# Patient Record
Sex: MALE | Race: WHITE | NOT HISPANIC OR LATINO | Employment: FULL TIME | RURAL
[De-identification: names, ages, dates, MRNs, and addresses within clinical notes are randomized per-mention and may not be internally consistent; named-entity substitution may affect disease eponyms.]

---

## 2018-04-30 ENCOUNTER — CONVERSION ENCOUNTER (OUTPATIENT)
Dept: FAMILY MEDICINE CLINIC | Facility: CLINIC | Age: 33
End: 2018-04-30

## 2018-04-30 LAB
ALBUMIN SERPL-MCNC: 4.7 G/DL (ref 3.6–5.1)
ALBUMIN/GLOB SERPL: ABNORMAL {RATIO} (ref 1–2.5)
ALP SERPL-CCNC: 60 UNITS/L (ref 40–115)
ALT SERPL-CCNC: 51 UNITS/L (ref 9–46)
AST SERPL-CCNC: 29 UNITS/L (ref 10–40)
BASOPHILS # BLD AUTO: ABNORMAL 10*3/MM3 (ref 0–200)
BASOPHILS NFR BLD AUTO: 0.7 %
BILIRUB SERPL-MCNC: 0.5 MG/DL (ref 0.2–1.2)
BUN SERPL-MCNC: 25 MG/DL (ref 7–25)
BUN/CREAT SERPL: ABNORMAL (ref 6–22)
CALCIUM SERPL-MCNC: 9.6 MG/DL (ref 8.6–10.3)
CHLORIDE SERPL-SCNC: 102 MMOL/L (ref 98–110)
CHOLEST SERPL-MCNC: 178 MG/DL
CHOLEST/HDLC SERPL: ABNORMAL {RATIO}
CO2 CONTENT VENOUS: 27 MMOL/L (ref 20–31)
CONV NEUTROPHILS/100 LEUKOCYTES IN BODY FLUID BY MANUAL COUNT: 49.5 %
CONV TOTAL PROTEIN: 7.3 G/DL (ref 6.1–8.1)
CREAT UR-MCNC: 0.99 MG/DL (ref 0.6–1.35)
EOSINOPHIL # BLD AUTO: 1.9 %
EOSINOPHIL # BLD AUTO: ABNORMAL 10*3/MM3 (ref 15–500)
ERYTHROCYTE [DISTWIDTH] IN BLOOD BY AUTOMATED COUNT: 12.8 % (ref 11–15)
GLOBULIN UR ELPH-MCNC: ABNORMAL G/DL (ref 1.9–3.7)
GLUCOSE SERPL-MCNC: 91 MG/DL (ref 65–99)
HCT VFR BLD AUTO: 47.1 % (ref 38.5–50)
HDLC SERPL-MCNC: 44 MG/DL
HGB BLD-MCNC: 16.1 G/DL (ref 13.2–17.1)
LDLC SERPL CALC-MCNC: ABNORMAL MG/DL
LYMPHOCYTES # BLD AUTO: ABNORMAL 10*3/MM3 (ref 850–3900)
LYMPHOCYTES NFR BLD AUTO: 35 %
MCH RBC QN AUTO: 30.2 PG (ref 27–33)
MCHC RBC AUTO-ENTMCNC: ABNORMAL % (ref 32–36)
MCV RBC AUTO: 88.4 FL (ref 80–100)
MONOCYTES # BLD AUTO: ABNORMAL 10*3/MICROLITER (ref 200–950)
MONOCYTES NFR BLD AUTO: 12.9 %
NEUTROPHILS # BLD AUTO: ABNORMAL 10*3/MM3 (ref 1500–7800)
PLATELET # BLD AUTO: ABNORMAL 10*3/MM3 (ref 140–400)
PMV BLD AUTO: 10.1 FL (ref 7.5–12.5)
POTASSIUM SERPL-SCNC: 4.4 MMOL/L (ref 3.5–5.3)
RBC # BLD AUTO: ABNORMAL 10*6/MM3 (ref 4.2–5.8)
SODIUM SERPL-SCNC: 138 MMOL/L (ref 135–146)
TRIGL SERPL-MCNC: 167 MG/DL
TSH SERPL-ACNC: 2.17 MICROINTL UNITS/ML (ref 0.4–4.5)
WBC # BLD AUTO: ABNORMAL K/UL (ref 3.8–10.8)

## 2019-06-04 VITALS
HEART RATE: 99 BPM | OXYGEN SATURATION: 97 % | DIASTOLIC BLOOD PRESSURE: 89 MMHG | BODY MASS INDEX: 37.96 KG/M2 | HEIGHT: 66 IN | WEIGHT: 236.19 LBS | RESPIRATION RATE: 18 BRPM | SYSTOLIC BLOOD PRESSURE: 130 MMHG

## 2021-01-29 ENCOUNTER — OFFICE VISIT (OUTPATIENT)
Dept: FAMILY MEDICINE CLINIC | Facility: CLINIC | Age: 36
End: 2021-01-29

## 2021-01-29 VITALS
DIASTOLIC BLOOD PRESSURE: 102 MMHG | BODY MASS INDEX: 40.53 KG/M2 | TEMPERATURE: 97.5 F | OXYGEN SATURATION: 97 % | SYSTOLIC BLOOD PRESSURE: 155 MMHG | RESPIRATION RATE: 18 BRPM | WEIGHT: 252.2 LBS | HEART RATE: 89 BPM | HEIGHT: 66 IN

## 2021-01-29 DIAGNOSIS — E66.01 MORBIDLY OBESE (HCC): ICD-10-CM

## 2021-01-29 DIAGNOSIS — R03.0 ELEVATED BLOOD PRESSURE READING: ICD-10-CM

## 2021-01-29 DIAGNOSIS — F41.9 ANXIETY: Primary | ICD-10-CM

## 2021-01-29 PROBLEM — N52.9 MALE ERECTILE DISORDER: Status: ACTIVE | Noted: 2018-04-30

## 2021-01-29 PROBLEM — J30.9 ALLERGIC RHINITIS: Status: ACTIVE | Noted: 2021-01-29

## 2021-01-29 PROBLEM — R53.83 FATIGUE: Status: ACTIVE | Noted: 2018-04-30

## 2021-01-29 PROBLEM — R68.82 REDUCED LIBIDO: Status: ACTIVE | Noted: 2018-04-30

## 2021-01-29 PROCEDURE — 99203 OFFICE O/P NEW LOW 30 MIN: CPT | Performed by: FAMILY MEDICINE

## 2021-01-29 RX ORDER — CITALOPRAM 20 MG/1
20 TABLET ORAL DAILY
Qty: 30 TABLET | Refills: 2 | Status: SHIPPED | OUTPATIENT
Start: 2021-01-29 | End: 2021-03-08 | Stop reason: SDUPTHER

## 2021-01-29 RX ORDER — MAGNESIUM 200 MG
1 TABLET ORAL
COMMUNITY
End: 2022-01-28

## 2021-01-29 NOTE — PROGRESS NOTES
"Chief Complaint  Anxiety (generalized)    Subjective    History of Present Illness        Paulino Miranda presents to [facility] for   History of Present Illness   Anxiety:   Patient is here today as a new patient to establish care and to discuss his anxiety. He reports he has had generalized anxiety since he was a teen. He was previously in counseling weekly for 5-6 months but had stopped and will soon start back. He reports his counselor recommended that he come to a doctor to discuss some type of medication in order to help with his anxiety.   Patient reports that specific things that make him anxious are his work schedule and work in general. He works for the [employer] and he has difficulty settling down to sleep because he is always wondering if they are going to call him in.   He reports hot flashes, heart racing, pacing, irritable, nausea, sometimes vomiting, insomnia, minds racing when he feels anxious. He wants to feel more at ease. He does feel therapy is helping but would like to discuss low dose medication options.     Objective   Vital Signs:   Visit Vitals  BP (!) 155/102   Pulse 89   Temp 97.5 °F (36.4 °C)   Resp 18   Ht 167.6 cm (66\")   Wt 114 kg (252 lb 3.2 oz)   SpO2 97%   BMI 40.71 kg/m²       Physical Exam  Vitals signs reviewed.   Constitutional:       Appearance: He is well-developed.   HENT:      Head: Normocephalic.      Right Ear: External ear normal.      Left Ear: External ear normal.      Nose: Nose normal.   Eyes:      Conjunctiva/sclera: Conjunctivae normal.   Neck:      Musculoskeletal: Normal range of motion and neck supple.   Cardiovascular:      Rate and Rhythm: Normal rate and regular rhythm.   Pulmonary:      Effort: Pulmonary effort is normal.      Breath sounds: Normal breath sounds.   Musculoskeletal: Normal range of motion.   Skin:     General: Skin is warm and dry.      Capillary Refill: Capillary refill takes less than 2 seconds. "   Neurological:      Mental Status: He is alert and oriented to person, place, and time.   Psychiatric:         Mood and Affect: Mood is anxious (This causes him to be irritable).            Result Review :                    Assessment and Plan      Diagnoses and all orders for this visit:    1. Anxiety (Primary)  Assessment & Plan:  Worsening his anxiety.  Patient was started on Celexa to help treat his symptoms.  Patient was encouraged to return the clinic in 1 month for follow-up.    Orders:  -     citalopram (CeleXA) 20 MG tablet; Take 1 tablet by mouth Daily.  Dispense: 30 tablet; Refill: 2    2. Morbidly obese (CMS/HCC)    3. Elevated blood pressure reading  Assessment & Plan:  Patient was advised to monitor his blood pressure at home and return to clinic in 1 month to recheck.             Follow Up   No follow-ups on file.  Patient was given instructions and counseling regarding his condition or for health maintenance advice. Please see specific information pulled into the AVS if appropriate.

## 2021-01-30 PROBLEM — F41.9 ANXIETY: Status: ACTIVE | Noted: 2021-01-30

## 2021-01-30 PROBLEM — R03.0 ELEVATED BLOOD PRESSURE READING: Status: ACTIVE | Noted: 2021-01-30

## 2021-01-30 NOTE — ASSESSMENT & PLAN NOTE
Patient was advised to monitor his blood pressure at home and return to clinic in 1 month to recheck.

## 2021-01-30 NOTE — ASSESSMENT & PLAN NOTE
Worsening his anxiety.  Patient was started on Celexa to help treat his symptoms.  Patient was encouraged to return the clinic in 1 month for follow-up.

## 2021-03-08 DIAGNOSIS — F41.9 ANXIETY: ICD-10-CM

## 2021-03-08 RX ORDER — CITALOPRAM 20 MG/1
20 TABLET ORAL DAILY
Qty: 30 TABLET | Refills: 2 | Status: SHIPPED | OUTPATIENT
Start: 2021-03-08 | End: 2021-06-30 | Stop reason: SDUPTHER

## 2021-06-30 DIAGNOSIS — F41.9 ANXIETY: ICD-10-CM

## 2021-06-30 RX ORDER — CITALOPRAM 20 MG/1
20 TABLET ORAL DAILY
Qty: 30 TABLET | Refills: 2 | Status: SHIPPED | OUTPATIENT
Start: 2021-06-30 | End: 2021-08-04 | Stop reason: SDUPTHER

## 2021-08-04 DIAGNOSIS — F41.9 ANXIETY: ICD-10-CM

## 2021-08-04 RX ORDER — CITALOPRAM 20 MG/1
20 TABLET ORAL DAILY
Qty: 18 TABLET | Refills: 0 | Status: SHIPPED | OUTPATIENT
Start: 2021-08-04 | End: 2021-08-20 | Stop reason: SDUPTHER

## 2021-08-20 ENCOUNTER — OFFICE VISIT (OUTPATIENT)
Dept: FAMILY MEDICINE CLINIC | Facility: CLINIC | Age: 36
End: 2021-08-20

## 2021-08-20 VITALS
WEIGHT: 266.2 LBS | DIASTOLIC BLOOD PRESSURE: 80 MMHG | OXYGEN SATURATION: 95 % | HEIGHT: 66 IN | HEART RATE: 77 BPM | SYSTOLIC BLOOD PRESSURE: 150 MMHG | RESPIRATION RATE: 16 BRPM | TEMPERATURE: 97.1 F | BODY MASS INDEX: 42.78 KG/M2

## 2021-08-20 DIAGNOSIS — F41.9 ANXIETY: Primary | ICD-10-CM

## 2021-08-20 PROCEDURE — 99213 OFFICE O/P EST LOW 20 MIN: CPT | Performed by: FAMILY MEDICINE

## 2021-08-20 RX ORDER — CITALOPRAM 10 MG/1
10 TABLET ORAL DAILY
Qty: 90 TABLET | Refills: 2 | Status: SHIPPED | OUTPATIENT
Start: 2021-08-20 | End: 2022-02-18 | Stop reason: SDUPTHER

## 2021-08-20 RX ORDER — DIPHENOXYLATE HYDROCHLORIDE AND ATROPINE SULFATE 2.5; .025 MG/1; MG/1
TABLET ORAL
COMMUNITY

## 2021-08-20 RX ORDER — FLUTICASONE PROPIONATE 50 MCG
SPRAY, SUSPENSION (ML) NASAL
COMMUNITY

## 2021-08-20 RX ORDER — CETIRIZINE HYDROCHLORIDE 10 MG/1
TABLET ORAL
COMMUNITY

## 2021-09-13 NOTE — ASSESSMENT & PLAN NOTE
Patient's medication was reduced to 10 mg daily.  Patient is encouraged to return to clinic in 4 weeks for follow-up.

## 2021-11-15 ENCOUNTER — OFFICE VISIT (OUTPATIENT)
Dept: SLEEP MEDICINE | Facility: CLINIC | Age: 36
End: 2021-11-15

## 2021-11-15 VITALS
SYSTOLIC BLOOD PRESSURE: 150 MMHG | DIASTOLIC BLOOD PRESSURE: 104 MMHG | HEART RATE: 119 BPM | OXYGEN SATURATION: 95 % | BODY MASS INDEX: 42.75 KG/M2 | WEIGHT: 266 LBS | HEIGHT: 66 IN

## 2021-11-15 DIAGNOSIS — E66.01 MORBID OBESITY (HCC): ICD-10-CM

## 2021-11-15 DIAGNOSIS — G47.8 NON-RESTORATIVE SLEEP: ICD-10-CM

## 2021-11-15 DIAGNOSIS — G47.10 HYPERSOMNIA: Primary | ICD-10-CM

## 2021-11-15 PROCEDURE — 99213 OFFICE O/P EST LOW 20 MIN: CPT | Performed by: FAMILY MEDICINE

## 2021-11-15 PROCEDURE — G0463 HOSPITAL OUTPT CLINIC VISIT: HCPCS

## 2021-11-15 NOTE — PROGRESS NOTES
[facility] New Patient/Consultation       Reason for Consultation: Hypersomnia nonrestorative sleep      Patient Care Team:  Haja Massey Sr., MD as PCP - General (Family Medicine)  Michael Finch MD as Consulting Physician (Sleep Medicine)      History of present illness:  Thank you for asking me to see your patient.  The patient is a 36 y.o. male with allergic rhinitis elevated blood pressure readings anxiety presents due to concern for sleep disorder.  Patient reports snoring waking up gasping for breath hypersomnia nonrestorative sleep morning headaches waking with dry mouth nocturia 1 time a night more sleepy when he increases his sleep time sudden episodes of sleep during the day getting 30 pounds in the past 5 years.  No history of prior sleep study or tonsillectomy.  No family history of sleep apnea patient is aware of.  Patient is morbidly obese with BMI 43.    Sleep Schedule:  Bed time: Weekdays 8:30 PM to 9:30 PM weekends 10 PM to 12 AM  Sleep latency: 20 to 30 minutes  Wake time: Weekdays 4 AM to 5 AM weekends 7 AM to 9 AM  Average hours slept: 5-7  Non-restorative sleep: Yes  Number of naps per day: 1 nap for 1 to 2 hours  Rotating shifts?:  No  Nocturia: Up to 1 time a night  Electronics in bedroom: Yes    Excessive daytime sleepiness or drowsiness:Y  Any accidents at work due to sleepiness in the last 5 years:N  Any difficulty driving due to sleepiness or being drowsy: Y  Weight changed in the last 5 years:Y - GAINED 30 LBS    Snoring:Y  Witnessed apneas:N  Have you ever awakened gasping for breath, coughing, choking or respiratory discomfort: Y  Morning headaches: Y  Awaken with a sore throat or dry mouth: Y    Any reports of leg jerking at night: N  Urge sensations: N  Does pain disrupt sleep: N  Sweating during sleep: N  Teeth grinding: N    Any sudden episodes of sleep during the day: Y  Sleep paralysis/hallucinations: N  Muscle weakness with laughing/anger: N  Nightmares: N  Sleep  "walking: N    Are you sleepy when you increase your sleep time: Y  Do you sleep better away from your own bed: N    ESS: 12    Social History:  tobacco use ages [age] no alcohol use no drug use 3 cups of coffee a day    Review of Systems:    A complete review of systems was done and all were negative with the exception of postnasal drip anxiety    Allergies:  Patient has no known allergies.    Family History: JAX no       Current Outpatient Medications:   •  cetirizine (zyrTEC) 10 MG tablet, Take  by mouth., Disp: , Rfl:   •  citalopram (CeleXA) 10 MG tablet, Take 1 tablet by mouth Daily., Disp: 90 tablet, Rfl: 2  •  fluticasone (FLONASE) 50 MCG/ACT nasal spray, into the nostril(s) as directed by provider., Disp: , Rfl:   •  Magnesium 200 MG tablet, Take 1 tablet by mouth., Disp: , Rfl:   •  multivitamin (MULTI-VITAMIN DAILY PO), Take  by mouth., Disp: , Rfl:   •  vitamin D3 125 MCG (5000 UT) capsule capsule, Take 5,000 Units by mouth Daily., Disp: , Rfl:   •  Zinc 50 MG capsule, Take 1 tablet by mouth., Disp: , Rfl:     Vital Signs:    Vitals:    11/15/21 1351   BP: (!) 150/104   BP Location: Left arm   Patient Position: Sitting   Cuff Size: Adult   Pulse: 119   SpO2: 95%   Weight: 121 kg (266 lb)   Height: 167.6 cm (66\")      Body mass index is 42.93 kg/m².         Physical Exam:   General Alert and oriented. No acute distress noted   Pharynx/Throat Class II/III Mallampati airway, large tongue, no evidence of redundant lateral pharyngeal tissue. No oral lesions. No thrush. Moist mucous membranes.   Head Normocephalic. Symmetrical. Atraumatic.    Nose No septal deviation. No drainage   Chest Wall Normal shape. Symmetric expansion with respiration. No tenderness.   Neck Trachea midline, no thyromegaly or adenopathy    Lungs Clear to auscultation bilaterally. No wheezes. No rhonchi. No rales. Respirations regular, even and unlabored.   Heart Regular rhythm and normal rate. Normal S1 and S2. No " murmur   Abdomen Soft, non-tender and non-distended. Normal bowel sounds. No masses.   Extremities Moves all extremities well. No edema   Psychiatric Normal mood and affect.       Impression:  1. Hypersomnia    2. Non-restorative sleep    3. Morbid obesity (HCC)        Plan:    Good sleep hygiene measures should be maintained.  Weight loss would be beneficial in this patient who is morbidly obese with BMI 43.    I discussed the pathophysiology of obstructive sleep apnea with the patient.  We discussed the adverse outcomes associated with untreated sleep-disordered breathing.  We discussed treatment modalities of obstructive sleep apnea including CPAP device as well as oral mandibular advancement device. Sleep study will be scheduled to establish definitive diagnosis of sleep disorder breathing.  Weight loss will be strongly beneficial in order to reduce the severity of sleep-disordered breathing.  Patient has narrow oropharyngeal structure.  Caution during activities that require prolonged concentration is strongly advised.  Patient will be notified of sleep study results after sleep study is completed.  If sleep apnea is only mild,  oral mandibular advancement device may be one of the treatment options.  However if sleep apnea is moderately severe, CPAP treatment will be strongly encouraged.  The patient is not opposed to treatment with CPAP device if we confirm significant obstructive sleep apnea on polysomnography.     Thank you for allowing me to participate in your patient's care.    Michael Finch MD  Sleep Medicine  11/15/21  14:26 EST

## 2021-12-01 ENCOUNTER — DOCUMENTATION (OUTPATIENT)
Dept: FAMILY MEDICINE CLINIC | Facility: CLINIC | Age: 36
End: 2021-12-01

## 2021-12-01 ENCOUNTER — TELEMEDICINE (OUTPATIENT)
Dept: FAMILY MEDICINE CLINIC | Facility: CLINIC | Age: 36
End: 2021-12-01

## 2021-12-01 DIAGNOSIS — R55 SYNCOPE, UNSPECIFIED SYNCOPE TYPE: ICD-10-CM

## 2021-12-01 DIAGNOSIS — I10 ESSENTIAL HYPERTENSION: Primary | ICD-10-CM

## 2021-12-01 DIAGNOSIS — Z09 HOSPITAL DISCHARGE FOLLOW-UP: ICD-10-CM

## 2021-12-01 DIAGNOSIS — R42 DIZZINESS: ICD-10-CM

## 2021-12-01 PROCEDURE — 99213 OFFICE O/P EST LOW 20 MIN: CPT | Performed by: FAMILY MEDICINE

## 2021-12-01 NOTE — PROGRESS NOTES
Chief Complaint  Hospital Follow Up Visit and Dizziness    Video Visit (Failed Mychart and Doximity)    Subjective    History of Present Illness        Paulino Miranda presents to [facility] for   Dizziness  This is a recurrent problem. The current episode started more than 1 month ago (11/17/2021 ). The problem occurs daily. Associated symptoms include chest pain, fatigue and vertigo (Dizziness). Pertinent negatives include no abdominal pain, anorexia, arthralgias, change in bowel habit, chills, congestion, coughing, diaphoresis, fever, headaches, joint swelling, myalgias, nausea, neck pain, numbness, rash, sore throat, swollen glands, urinary symptoms, visual change, vomiting or weakness. Associated symptoms comments: Patient was seen at [facility] 11/17/2021 for Near syncope and chest discomfort. EKG and Ct brain was performed and CT was normal. EKG was Normal Sinus and Normal ECG. He states that he is feeling ok. He states that he has been getting lightheaded when he was sneezing. He states that he has not passed out anymore and he states  that he has not felt as if he was going to. He states that his Bp was higher in the er 155/103 and he said that they was not concerned about this. He states that he is always higher when he goes places and his pressure is checked but he states that the cuff he has at home always says he is fine but he state that he is not sure that it is accurate. He states that he has been having more headaches and he states that he thought it was due to him having sinus issues. . Nothing aggravates the symptoms. He has tried nothing for the symptoms.   Hypertension  This is a new problem. The current episode started 1 to 4 weeks ago. The problem is unchanged. The problem is uncontrolled. Associated symptoms include anxiety, chest pain and malaise/fatigue. Pertinent negatives include no blurred vision, headaches, neck pain, orthopnea, palpitations, peripheral edema,  PND, shortness of breath or sweats. (In the ER on 11/17/2021 it was 155/103 and he was told it was normal ) Risk factors for coronary artery disease include family history, male gender, obesity and stress. Past treatments include nothing. Current antihypertension treatment includes nothing. The current treatment provides no improvement. There are no compliance problems.  There is no history of angina, kidney disease, CAD/MI, CVA, heart failure, left ventricular hypertrophy, PVD or retinopathy. There is no history of chronic renal disease, coarctation of the aorta, hyperaldosteronism, hypercortisolism, hyperparathyroidism, a hypertension causing med, pheochromocytoma, renovascular disease, sleep apnea or a thyroid problem.      Hospital Follow up :   Paulino was seen at [facility] on 11/17/2021. He was seen for chest pain and near syncope. Labs that were performed during the encounter included: labs was performed AST was 48 and ALT was 89 . Diagnostic studies that were performed included: EKG , CT brain w/o contrast . Medication changes: None .      Objective   Vital Signs:   There were no vitals taken for this visit.    Physical Exam       Result Review :                    Assessment and Plan      Diagnoses and all orders for this visit:    1. Essential hypertension (Primary)  Assessment & Plan:  Hypertension is worsening.  Continue current treatment regimen.  Dietary sodium restriction.  Weight loss.  Regular aerobic exercise.  Blood pressure will be reassessed at the next regular appointment.      2. Dizziness  Assessment & Plan:  Unknown etiology to patient's symptoms.  Carotid duplex be ordered to rule out causes of patient's syncope.  Consider referral to neurology for syncope worsens      3. Hospital discharge follow-up       I spent 15 minutes caring for Paulino on this date of service. This time includes time spent by me in the following activities:preparing for the visit, ordering medications,  tests, or procedures and care coordination    The use of a video visit has been reviewed with the patient and verbal informed consent has been obtained.      Follow Up   No follow-ups on file.  Patient was given instructions and counseling regarding his condition or for health maintenance advice. Please see specific information pulled into the AVS if appropriate.

## 2021-12-08 ENCOUNTER — TELEPHONE (OUTPATIENT)
Dept: FAMILY MEDICINE CLINIC | Facility: CLINIC | Age: 36
End: 2021-12-08

## 2021-12-08 NOTE — TELEPHONE ENCOUNTER
I have called downstairs and she said that he is on the scheduling list. She said that she will call him this AM to get it scheduled

## 2021-12-08 NOTE — TELEPHONE ENCOUNTER
Caller: Paulino Miranda    Relationship: Self    Best call back number: [phone]    What orders are you requesting (i.e. lab or imaging): CAROTID ARTERY ULTRASOUND      Additional notes: PATIENT CALLED TO CHECK ON THE STATUS OF THESE ORDERS.  PLEASE CALL AND ADVISE.

## 2021-12-09 ENCOUNTER — HOSPITAL ENCOUNTER (OUTPATIENT)
Dept: ULTRASOUND IMAGING | Facility: HOSPITAL | Age: 36
Discharge: HOME | End: 2021-12-09
Admitting: FAMILY MEDICINE

## 2021-12-09 DIAGNOSIS — R55 SYNCOPE, UNSPECIFIED SYNCOPE TYPE: ICD-10-CM

## 2021-12-09 DIAGNOSIS — R42 DIZZINESS: ICD-10-CM

## 2021-12-09 PROCEDURE — 93880 EXTRACRANIAL BILAT STUDY: CPT

## 2021-12-13 ENCOUNTER — HOSPITAL ENCOUNTER (OUTPATIENT)
Dept: SLEEP MEDICINE | Facility: HOSPITAL | Age: 36
Discharge: HOME | End: 2021-12-13
Admitting: FAMILY MEDICINE

## 2021-12-13 DIAGNOSIS — G47.8 NON-RESTORATIVE SLEEP: ICD-10-CM

## 2021-12-13 DIAGNOSIS — G47.10 HYPERSOMNIA: ICD-10-CM

## 2021-12-13 DIAGNOSIS — E66.01 MORBID OBESITY: ICD-10-CM

## 2021-12-13 PROCEDURE — 95806 SLEEP STUDY UNATT&RESP EFFT: CPT

## 2021-12-13 PROCEDURE — 95806 SLEEP STUDY UNATT&RESP EFFT: CPT | Performed by: FAMILY MEDICINE

## 2022-01-03 ENCOUNTER — OFFICE VISIT (OUTPATIENT)
Dept: FAMILY MEDICINE CLINIC | Facility: CLINIC | Age: 37
End: 2022-01-03

## 2022-01-03 VITALS
SYSTOLIC BLOOD PRESSURE: 140 MMHG | OXYGEN SATURATION: 98 % | BODY MASS INDEX: 41.49 KG/M2 | WEIGHT: 280.1 LBS | TEMPERATURE: 97.9 F | HEART RATE: 115 BPM | HEIGHT: 69 IN | RESPIRATION RATE: 18 BRPM | DIASTOLIC BLOOD PRESSURE: 98 MMHG

## 2022-01-03 DIAGNOSIS — E66.01 MORBIDLY OBESE: ICD-10-CM

## 2022-01-03 DIAGNOSIS — F17.200 TOBACCO USE DISORDER: ICD-10-CM

## 2022-01-03 DIAGNOSIS — G47.33 OBSTRUCTIVE SLEEP APNEA: Primary | ICD-10-CM

## 2022-01-03 DIAGNOSIS — I10 ESSENTIAL HYPERTENSION: Primary | ICD-10-CM

## 2022-01-03 DIAGNOSIS — G47.10 HYPERSOMNIA: ICD-10-CM

## 2022-01-03 DIAGNOSIS — R55 SYNCOPE, UNSPECIFIED SYNCOPE TYPE: ICD-10-CM

## 2022-01-03 DIAGNOSIS — E66.01 MORBID OBESITY: ICD-10-CM

## 2022-01-03 DIAGNOSIS — G47.8 NON-RESTORATIVE SLEEP: ICD-10-CM

## 2022-01-03 PROCEDURE — 99214 OFFICE O/P EST MOD 30 MIN: CPT | Performed by: FAMILY MEDICINE

## 2022-01-03 RX ORDER — LISINOPRIL AND HYDROCHLOROTHIAZIDE 12.5; 1 MG/1; MG/1
1 TABLET ORAL DAILY
Qty: 30 TABLET | Refills: 1 | Status: SHIPPED | OUTPATIENT
Start: 2022-01-03 | End: 2022-02-18 | Stop reason: SDUPTHER

## 2022-01-03 NOTE — PROGRESS NOTES
Chief Complaint  Syncope and Hypertension    Subjective    History of Present Illness        Paulino Miranda presents to [facility] for   Paulino was seen at [facility] on 11.17/2021. He was seen for syncope. Labs that were performed during the encounter included: Diagnostic studies that were performed included:  Medication changes: none.    Syncope:   The syncopal episode occurred while the patient was standing, one month ago. The fainting episode followed none. Prodromal symptoms included dizziness and lightheadedness. History was given by the patient. The patient a sudden loss of consciousness without warning. There has been only one episode. Preventive factors include none. Associated symptoms included no other pertinent symptoms. Current treatment includes none.    Hypertension  This is a new problem. The current episode started more than 1 year ago. The problem has been gradually worsening since onset. The problem is uncontrolled. Associated symptoms include malaise/fatigue. Pertinent negatives include no chest pain, headaches, orthopnea, palpitations, peripheral edema, PND, shortness of breath or sweats. Risk factors for coronary artery disease include obesity and male gender. Past treatments include nothing. Current antihypertension treatment includes nothing.      Anxiety:   Patient is here today as a new patient to establish care and to discuss his anxiety. He reports he has had generalized anxiety since he was a teen. He was previously in counseling weekly for 5-6 months but had stopped and will soon start back. He reports his counselor recommended that he come to a doctor to discuss some type of medication in order to help with his anxiety.   Patient reports that specific things that make him anxious are his work schedule and work in general. He works for the [employer] and he has difficulty settling down to sleep because he is always wondering if they are  "going to call him in.   He reports hot flashes, heart racing, pacing, irritable, nausea, sometimes vomiting, insomnia, minds racing when he feels anxious. He wants to feel more at ease. He does feel therapy is helping but would like to discuss low dose medication options.     Objective   Vital Signs:   Visit Vitals  /98 (BP Location: Right arm, Patient Position: Sitting, Cuff Size: Adult)   Pulse 115   Temp 97.9 °F (36.6 °C) (Temporal)   Resp 18   Ht 175.3 cm (69\")   Wt 127 kg (280 lb 1.6 oz)   SpO2 98% Comment: room air   BMI 41.36 kg/m²       Physical Exam  Vitals reviewed.   Constitutional:       Appearance: He is well-developed.   HENT:      Head: Normocephalic.      Right Ear: External ear normal.      Left Ear: External ear normal.      Nose: Nose normal.   Eyes:      Conjunctiva/sclera: Conjunctivae normal.   Cardiovascular:      Rate and Rhythm: Normal rate and regular rhythm.   Pulmonary:      Effort: Pulmonary effort is normal.      Breath sounds: Normal breath sounds.   Musculoskeletal:         General: Normal range of motion.      Cervical back: Normal range of motion and neck supple.   Skin:     General: Skin is warm and dry.      Capillary Refill: Capillary refill takes less than 2 seconds.   Neurological:      Mental Status: He is alert and oriented to person, place, and time.   Psychiatric:         Mood and Affect: Mood is anxious (This causes him to be irritable).            Result Review :                    Assessment and Plan      Diagnoses and all orders for this visit:    1. Essential hypertension (Primary)  Assessment & Plan:  Hypertension is worsening.  Continue current treatment regimen.  Dietary sodium restriction.  Weight loss.  Regular aerobic exercise.  Blood pressure will be reassessed in 4 weeks.    Orders:  -     lisinopril-hydrochlorothiazide (PRINZIDE,ZESTORETIC) 10-12.5 MG per tablet; Take 1 tablet by mouth Daily.  Dispense: 30 tablet; Refill: 1    2. Syncope, unspecified " syncope type  Assessment & Plan:  Processes a syncopal episode occurred during laughter or sneezing.  Unknown etiology of symptoms.  Refer patient to cardiology for further evaluation and treatment.        3. Morbidly obese (HCC)    4. Tobacco use disorder           Follow Up   No follow-ups on file.  Patient was given instructions and counseling regarding his condition or for health maintenance advice. Please see specific information pulled into the AVS if appropriate.       Answers for HPI/ROS submitted by the patient on 8/13/2021  What is the primary reason for your visit?: Physical

## 2022-01-06 DIAGNOSIS — R55 SYNCOPE, UNSPECIFIED SYNCOPE TYPE: ICD-10-CM

## 2022-01-06 DIAGNOSIS — I10 ESSENTIAL HYPERTENSION: Primary | ICD-10-CM

## 2022-01-06 NOTE — ASSESSMENT & PLAN NOTE
Processes a syncopal episode occurred during laughter or sneezing.  Unknown etiology of symptoms.  Refer patient to cardiology for further evaluation and treatment.

## 2022-01-27 NOTE — PROGRESS NOTES
Date of Office Visit: 2022  Encounter Provider: Dr. Richmond Martines  Place of Service: [facility] [place]  Patient Name: Paulino Miranda  :1985  Haja Massey Sr., MD    Chief Complaint   Patient presents with   • Hypertension     consult   • Syncope     History of Present Illness:    I am pleased to see Mr. Miranda in my office today as a new consultation.    As you know, patient is 36-year-old white gentleman whose past medical history significant for hypertension, who is referred to me for symptom of syncope.    In 2021, patient was at his work when he was sitting and he started laughing.  And immediately after the laughing spell, patient passed out.  The spell was short-lived.  Patient had few episodes of dizziness and near syncope since then.  Patient reports that mostly is triggered with laughing but it has also been without that.  Patient complain of palpitation and chest discomfort.  Patient denies any orthopnea PND.  No leg edema noted.    Patient does not have previous history of CAD, PCI or MI.  No previous history of tobacco abuse, or alcohol abuse.    EKG done in PCP office shows normal sinus rhythm.  Short CO interval.    At this stage, I would recommend that patient should proceed with stress echocardiography.  I would proceed with event monitor.  Patient would need tilt table test in future if initial testings are unremarkable        Past Medical History:   Diagnosis Date   • Anxiety    • Hypertension    • Sleep apnea    • Syncope          History reviewed. No pertinent surgical history.        Current Outpatient Medications:   •  cetirizine (zyrTEC) 10 MG tablet, Take  by mouth., Disp: , Rfl:   •  citalopram (CeleXA) 10 MG tablet, Take 1 tablet by mouth Daily., Disp: 90 tablet, Rfl: 2  •  fluticasone (FLONASE) 50 MCG/ACT nasal spray, into the nostril(s) as directed by provider., Disp: , Rfl:   •  lisinopril-hydrochlorothiazide (PRINZIDE,ZESTORETIC) 10-12.5 MG per  "tablet, Take 1 tablet by mouth Daily., Disp: 30 tablet, Rfl: 1  •  multivitamin (MULTI-VITAMIN DAILY PO), Take  by mouth., Disp: , Rfl:   •  vitamin D3 125 MCG (5000 UT) capsule capsule, Take 5,000 Units by mouth Daily., Disp: , Rfl:       Social History     Socioeconomic History   • Marital status:    Tobacco Use   • Smoking status: Never Smoker   • Smokeless tobacco: Current User     Types: Snuff   Vaping Use   • Vaping Use: Never used   Substance and Sexual Activity   • Alcohol use: Not Currently   • Drug use: Never   • Sexual activity: Defer         Review of Systems   Constitutional: Negative for chills and fever.   HENT: Negative for ear discharge and nosebleeds.    Eyes: Negative for discharge and redness.   Cardiovascular: Positive for chest pain, palpitations and syncope. Negative for orthopnea and paroxysmal nocturnal dyspnea.   Respiratory: Negative for cough, shortness of breath and wheezing.    Endocrine: Negative for heat intolerance.   Skin: Negative for rash.   Musculoskeletal: Negative for arthritis and myalgias.   Gastrointestinal: Negative for abdominal pain, melena, nausea and vomiting.   Genitourinary: Negative for dysuria and hematuria.   Neurological: Negative for dizziness, light-headedness, numbness and tremors.   Psychiatric/Behavioral: Negative for depression. The patient is not nervous/anxious.        Procedures    Procedures    No orders to display           Objective:    /87   Pulse 89   Ht 175.3 cm (69.02\")   Wt 127 kg (280 lb)   BMI 41.33 kg/m²         Constitutional:       Appearance: Well-developed.   Eyes:      General: No scleral icterus.        Right eye: No discharge.   HENT:      Head: Normocephalic and atraumatic.   Neck:      Thyroid: No thyromegaly.      Lymphadenopathy: No cervical adenopathy.   Pulmonary:      Effort: Pulmonary effort is normal. No respiratory distress.      Breath sounds: Normal breath sounds. No wheezing. No rales.   Cardiovascular:      " Normal rate. Regular rhythm.      No gallop.   Abdominal:      Tenderness: There is no abdominal tenderness.   Skin:     Findings: No erythema or rash.   Neurological:      Mental Status: Alert and oriented to person, place, and time.             Assessment:       Diagnosis Plan   1. Essential hypertension  Adult Stress Echo W/ Cont or Stress Agent if Necessary Per Protocol    Cardiac Event Monitor   2. Syncope, unspecified syncope type  Adult Stress Echo W/ Cont or Stress Agent if Necessary Per Protocol    Cardiac Event Monitor   3. Palpitations  Adult Stress Echo W/ Cont or Stress Agent if Necessary Per Protocol    Cardiac Event Monitor            Plan:       MDM:    1.  Palpitation:    I would recommend to proceed with event monitor    2.  Syncope:    I would recommend to proceed with stress test with echocardiography and event monitor.  Patient may need tilt table test    3.  Hypertension:    Continue lisinopril.  Monitor the blood pressure at home and bring the logbook.  Consider Bystolic or Toprol-XL in future if blood pressure control required

## 2022-01-28 ENCOUNTER — OFFICE VISIT (OUTPATIENT)
Dept: CARDIOLOGY | Facility: CLINIC | Age: 37
End: 2022-01-28

## 2022-01-28 VITALS
SYSTOLIC BLOOD PRESSURE: 137 MMHG | BODY MASS INDEX: 41.47 KG/M2 | HEART RATE: 89 BPM | DIASTOLIC BLOOD PRESSURE: 87 MMHG | HEIGHT: 69 IN | WEIGHT: 280 LBS

## 2022-01-28 DIAGNOSIS — I10 ESSENTIAL HYPERTENSION: Primary | ICD-10-CM

## 2022-01-28 DIAGNOSIS — R55 SYNCOPE, UNSPECIFIED SYNCOPE TYPE: ICD-10-CM

## 2022-01-28 DIAGNOSIS — R00.2 PALPITATIONS: ICD-10-CM

## 2022-01-28 PROCEDURE — 99204 OFFICE O/P NEW MOD 45 MIN: CPT | Performed by: INTERNAL MEDICINE

## 2022-02-18 ENCOUNTER — OFFICE VISIT (OUTPATIENT)
Dept: FAMILY MEDICINE CLINIC | Facility: CLINIC | Age: 37
End: 2022-02-18

## 2022-02-18 VITALS
RESPIRATION RATE: 18 BRPM | HEIGHT: 70 IN | TEMPERATURE: 98.1 F | BODY MASS INDEX: 39.22 KG/M2 | SYSTOLIC BLOOD PRESSURE: 135 MMHG | OXYGEN SATURATION: 98 % | DIASTOLIC BLOOD PRESSURE: 70 MMHG | WEIGHT: 274 LBS | HEART RATE: 86 BPM

## 2022-02-18 DIAGNOSIS — F41.9 ANXIETY: ICD-10-CM

## 2022-02-18 DIAGNOSIS — I10 ESSENTIAL HYPERTENSION: Primary | ICD-10-CM

## 2022-02-18 PROCEDURE — 99214 OFFICE O/P EST MOD 30 MIN: CPT | Performed by: FAMILY MEDICINE

## 2022-02-18 RX ORDER — CITALOPRAM 10 MG/1
10 TABLET ORAL DAILY
Qty: 90 TABLET | Refills: 3 | Status: SHIPPED | OUTPATIENT
Start: 2022-02-18 | End: 2022-09-12 | Stop reason: SDUPTHER

## 2022-02-18 RX ORDER — LISINOPRIL AND HYDROCHLOROTHIAZIDE 12.5; 1 MG/1; MG/1
1 TABLET ORAL DAILY
Qty: 90 TABLET | Refills: 2 | Status: SHIPPED | OUTPATIENT
Start: 2022-02-18 | End: 2022-07-20

## 2022-02-21 ENCOUNTER — APPOINTMENT (OUTPATIENT)
Dept: RESPIRATORY THERAPY | Facility: HOSPITAL | Age: 37
End: 2022-02-21

## 2022-02-21 ENCOUNTER — APPOINTMENT (OUTPATIENT)
Dept: CARDIOLOGY | Facility: HOSPITAL | Age: 37
End: 2022-02-21

## 2022-03-12 NOTE — ASSESSMENT & PLAN NOTE
Patient is doing well on Celexa.  Patient was given a refill of his medication.  Patient is encouraged return to clinic in 6 months.

## 2022-07-20 ENCOUNTER — OFFICE VISIT (OUTPATIENT)
Dept: FAMILY MEDICINE CLINIC | Facility: CLINIC | Age: 37
End: 2022-07-20

## 2022-07-20 VITALS
DIASTOLIC BLOOD PRESSURE: 82 MMHG | WEIGHT: 284 LBS | BODY MASS INDEX: 40.66 KG/M2 | HEART RATE: 104 BPM | SYSTOLIC BLOOD PRESSURE: 122 MMHG | HEIGHT: 70 IN | OXYGEN SATURATION: 96 % | TEMPERATURE: 97.7 F

## 2022-07-20 DIAGNOSIS — Z11.59 ENCOUNTER FOR HEPATITIS C SCREENING TEST FOR LOW RISK PATIENT: ICD-10-CM

## 2022-07-20 DIAGNOSIS — I10 ESSENTIAL HYPERTENSION: ICD-10-CM

## 2022-07-20 DIAGNOSIS — N39.43 DRIBBLING URINE: ICD-10-CM

## 2022-07-20 DIAGNOSIS — Z00.01 ENCOUNTER FOR GENERAL ADULT MEDICAL EXAMINATION WITH ABNORMAL FINDINGS: Primary | ICD-10-CM

## 2022-07-20 PROCEDURE — 99213 OFFICE O/P EST LOW 20 MIN: CPT | Performed by: NURSE PRACTITIONER

## 2022-07-20 PROCEDURE — 99395 PREV VISIT EST AGE 18-39: CPT | Performed by: NURSE PRACTITIONER

## 2022-07-20 RX ORDER — ALBUTEROL SULFATE 90 UG/1
AEROSOL, METERED RESPIRATORY (INHALATION)
COMMUNITY
Start: 2022-05-18 | End: 2022-07-20

## 2022-07-20 RX ORDER — LISINOPRIL 20 MG/1
20 TABLET ORAL DAILY
Qty: 90 TABLET | Refills: 1 | Status: SHIPPED | OUTPATIENT
Start: 2022-07-20 | End: 2023-01-11

## 2022-07-20 NOTE — PROGRESS NOTES
"Subjective   Paulino Miranda is a 37 y.o. male presents for   Chief Complaint   Patient presents with   • Establish Care     Annual    • Medication Reaction     Blood pressure medication    • Hypertension     Annual        Health Maintenance Due   Topic Date Due   • HEPATITIS C SCREENING  Never done       History of Present Illness   Pt present to establish care, annual physical, and to follow up htn.  He states he has been experiencing dribbling of urine 2-3 time a week and feels it is related to diuretic.  He denies pain or burning with urination.  He denies other problems or side effects at this time.    Discussed benefits of balanced diet and routine exercise.  Also reviewed vaccines and pt is utd on all recommended vaccines.  Discussed hep c screening and pt risk level in his job.  He is agreeable to testing.     Vitals:    07/20/22 1443   BP: 122/82   BP Location: Right arm   Patient Position: Sitting   Cuff Size: Adult   Pulse: 104   Temp: 97.7 °F (36.5 °C)   TempSrc: Temporal   SpO2: 96%   Weight: 129 kg (284 lb)   Height: 177.8 cm (70\")     Body mass index is 40.75 kg/m².    Current Outpatient Medications on File Prior to Visit   Medication Sig Dispense Refill   • cetirizine (zyrTEC) 10 MG tablet Take  by mouth.     • citalopram (CeleXA) 10 MG tablet Take 1 tablet by mouth Daily. 90 tablet 3   • fluticasone (FLONASE) 50 MCG/ACT nasal spray into the nostril(s) as directed by provider.     • multivitamin (THERAGRAN) tablet tablet Take  by mouth.     • vitamin D3 125 MCG (5000 UT) capsule capsule Take 5,000 Units by mouth Daily.     • [DISCONTINUED] lisinopril-hydrochlorothiazide (PRINZIDE,ZESTORETIC) 10-12.5 MG per tablet Take 1 tablet by mouth Daily. 90 tablet 2   • [DISCONTINUED] albuterol sulfate  (90 Base) MCG/ACT inhaler INHALE 2 PUFFS INTO THE LUNGS EVERY 4 HOURS AS NEEDED WHEEZING       No current facility-administered medications on file prior to visit.       The following portions of the " patient's history were reviewed and updated as appropriate: allergies, current medications, past family history, past medical history, past social history, past surgical history, and problem list.    Review of Systems   Constitutional: Negative for chills and fever.   HENT: Negative for sinus pressure and sore throat.    Eyes: Negative for blurred vision.   Respiratory: Negative for cough and shortness of breath.    Cardiovascular: Negative for chest pain.   Gastrointestinal: Negative for abdominal pain.   Endocrine: Negative.    Genitourinary:        Dribbling urine 2-3 times a week   Musculoskeletal: Negative for arthralgias and joint swelling.   Skin: Negative for color change.   Allergic/Immunologic: Negative.    Neurological: Negative for dizziness.   Hematological: Negative.    Psychiatric/Behavioral: Negative for behavioral problems.       Objective   Physical Exam  Vitals and nursing note reviewed.   Constitutional:       Appearance: Normal appearance. He is well-developed. He is obese.   HENT:      Head: Normocephalic and atraumatic.      Right Ear: External ear normal.      Left Ear: External ear normal.      Nose: Nose normal.   Eyes:      Extraocular Movements: Extraocular movements intact.      Pupils: Pupils are equal, round, and reactive to light.   Cardiovascular:      Rate and Rhythm: Normal rate and regular rhythm.      Heart sounds: Normal heart sounds.   Pulmonary:      Effort: Pulmonary effort is normal.      Breath sounds: Normal breath sounds.   Abdominal:      General: Bowel sounds are normal.      Palpations: Abdomen is soft.   Musculoskeletal:         General: Normal range of motion.      Cervical back: Normal range of motion and neck supple.   Skin:     General: Skin is warm and dry.   Neurological:      General: No focal deficit present.      Mental Status: He is alert and oriented to person, place, and time.   Psychiatric:         Mood and Affect: Mood normal.         Behavior: Behavior  normal.       PHQ-9 Total Score:      Assessment & Plan   Diagnoses and all orders for this visit:    1. Encounter for general adult medical examination with abnormal findings (Primary)  -     Comprehensive Metabolic Panel; Future  -     Lipid Panel; Future  -     TSH; Future    2. Essential hypertension  Comments:  monitor BP and call if it gets too low.  lisinopril increased to 20 mg and hctz stopped.   Orders:  -     CBC Auto Differential; Future    3. Encounter for hepatitis C screening test for low risk patient  -     Hepatitis C Antibody; Future    4. Dribbling urine  Comments:  hctz stopped.  pt instructed to call if symptoms persist and will do additional labs to determine cause.     Other orders  -     lisinopril (PRINIVIL,ZESTRIL) 20 MG tablet; Take 1 tablet by mouth Daily.  Dispense: 90 tablet; Refill: 1        There are no Patient Instructions on file for this visit.

## 2022-08-13 ENCOUNTER — PATIENT ROUNDING (BHMG ONLY) (OUTPATIENT)
Dept: FAMILY MEDICINE CLINIC | Facility: CLINIC | Age: 37
End: 2022-08-13

## 2022-08-13 NOTE — PROGRESS NOTES
A [employer] message has been sent to the patient for patient rounding with [facility]

## 2022-09-12 ENCOUNTER — OFFICE VISIT (OUTPATIENT)
Dept: FAMILY MEDICINE CLINIC | Facility: CLINIC | Age: 37
End: 2022-09-12

## 2022-09-12 VITALS
HEART RATE: 111 BPM | WEIGHT: 280 LBS | OXYGEN SATURATION: 96 % | SYSTOLIC BLOOD PRESSURE: 128 MMHG | TEMPERATURE: 97.1 F | BODY MASS INDEX: 40.09 KG/M2 | HEIGHT: 70 IN | DIASTOLIC BLOOD PRESSURE: 98 MMHG | RESPIRATION RATE: 18 BRPM

## 2022-09-12 DIAGNOSIS — J01.00 ACUTE NON-RECURRENT MAXILLARY SINUSITIS: ICD-10-CM

## 2022-09-12 DIAGNOSIS — N52.2 DRUG-INDUCED ERECTILE DYSFUNCTION: ICD-10-CM

## 2022-09-12 DIAGNOSIS — I10 ESSENTIAL HYPERTENSION: ICD-10-CM

## 2022-09-12 DIAGNOSIS — F41.9 ANXIETY: Primary | ICD-10-CM

## 2022-09-12 PROCEDURE — 99214 OFFICE O/P EST MOD 30 MIN: CPT | Performed by: FAMILY MEDICINE

## 2022-09-12 RX ORDER — GUAIFENESIN AND CODEINE PHOSPHATE 100; 10 MG/5ML; MG/5ML
5 SOLUTION ORAL 3 TIMES DAILY PRN
Qty: 180 ML | Refills: 0 | Status: SHIPPED | OUTPATIENT
Start: 2022-09-12

## 2022-09-12 RX ORDER — PREDNISONE 20 MG/1
40 TABLET ORAL DAILY
Qty: 10 TABLET | Refills: 0 | Status: SHIPPED | OUTPATIENT
Start: 2022-09-12 | End: 2022-09-17

## 2022-09-12 RX ORDER — CIPROFLOXACIN 500 MG/1
500 TABLET, FILM COATED ORAL 2 TIMES DAILY
Qty: 20 TABLET | Refills: 0 | Status: SHIPPED | OUTPATIENT
Start: 2022-09-12

## 2022-09-12 RX ORDER — CITALOPRAM 20 MG/1
20 TABLET ORAL DAILY
Qty: 90 TABLET | Refills: 3 | Status: SHIPPED | OUTPATIENT
Start: 2022-09-12

## 2022-09-12 RX ORDER — TADALAFIL 5 MG/1
5 TABLET ORAL DAILY PRN
Qty: 90 TABLET | Refills: 3 | Status: SHIPPED | OUTPATIENT
Start: 2022-09-12

## 2022-09-12 NOTE — ASSESSMENT & PLAN NOTE
Worsening.  Patient Celexa was increased.  Patient was encouraged return to clinic in 3 months for follow-up.

## 2022-09-12 NOTE — PROGRESS NOTES
"Chief Complaint  Chief Complaint   Patient presents with   • mucus drainage     Pt c/o mucus drainage and ear ache x 4 days   • Hypertension       Subjective    History of Present Illness        Paulino Miranda presents to [facility] for   Hypertension  This is a chronic problem. The current episode started more than 1 year ago. The problem has been rapidly improving since onset. The problem is controlled. Associated symptoms include anxiety. Pertinent negatives include no blurred vision, chest pain, headaches, malaise/fatigue, neck pain, orthopnea, palpitations, peripheral edema, PND, shortness of breath or sweats. Risk factors for coronary artery disease include male gender. Past treatments include ACE inhibitors. Current antihypertension treatment includes ACE inhibitors. There are no compliance problems.    Anxiety  Presents for follow-up visit. Symptoms include decreased concentration (sometimes), irritability and nervous/anxious behavior. Patient reports no chest pain, compulsions, insomnia, muscle tension, palpitations or shortness of breath. Symptoms occur rarely (with Celexa 10mg). The severity of symptoms is mild. The patient sleeps 6 hours (to 8) per night. The quality of sleep is good. Nighttime awakenings: occasional.     Compliance with medications is %.   Sinusitis  This is a new problem. The current episode started in the past 7 days. The problem has been gradually worsening since onset. There has been no fever. Associated symptoms include congestion, coughing, ear pain and sinus pressure. Pertinent negatives include no headaches, neck pain or shortness of breath. The treatment provided no relief.        Objective   Vital Signs:   Visit Vitals  /98   Pulse 111   Temp 97.1 °F (36.2 °C)   Resp 18   Ht 177.8 cm (70\")   Wt 127 kg (280 lb)   SpO2 96%   BMI 40.18 kg/m²       Class 3 Severe Obesity (BMI >=40). Obesity-related health conditions include the " following: hypertension. Obesity is unchanged. BMI is is above average; BMI management plan is completed. We discussed low calorie, low carb based diet program, portion control and increasing exercise.     Physical Exam  Vitals reviewed.   Constitutional:       Appearance: He is well-developed. He is ill-appearing.   HENT:      Head: Normocephalic.      Right Ear: Tympanic membrane, ear canal and external ear normal.      Left Ear: Tympanic membrane, ear canal and external ear normal.      Nose: Rhinorrhea present.      Mouth/Throat:      Pharynx: Posterior oropharyngeal erythema present.   Eyes:      Conjunctiva/sclera: Conjunctivae normal.   Cardiovascular:      Rate and Rhythm: Normal rate and regular rhythm.      Heart sounds: Normal heart sounds.   Pulmonary:      Effort: Pulmonary effort is normal.      Breath sounds: Normal breath sounds.   Musculoskeletal:         General: Normal range of motion.      Cervical back: Normal range of motion.   Skin:     General: Skin is warm and dry.      Capillary Refill: Capillary refill takes less than 2 seconds.   Neurological:      Mental Status: He is alert and oriented to person, place, and time.            Result Review :                    Assessment and Plan      Diagnoses and all orders for this visit:    1. Anxiety (Primary)  Assessment & Plan:  Worsening.  Patient Celexa was increased.  Patient was encouraged return to clinic in 3 months for follow-up.    Orders:  -     citalopram (CeleXA) 20 MG tablet; Take 1 tablet by mouth Daily.  Dispense: 90 tablet; Refill: 3    2. Drug-induced erectile dysfunction  Assessment & Plan:  Patient was started on Cialis 5 mg daily.  Patient is encouraged return to clinic if her symptoms do not improve.    Orders:  -     tadalafil (CIALIS) 5 MG tablet; Take 1 tablet by mouth Daily As Needed for Erectile Dysfunction.  Dispense: 90 tablet; Refill: 3    3. Acute non-recurrent maxillary sinusitis  Assessment & Plan:  he was prescribed  Cipro, prednisone and Cheratussin to treat his symptoms.    Increase fluids. Tylenol/motrin for pain or fever.   Medication and medication adverse effects discussed.    Follow-up 5-7 days for reevaluation if not improved or sooner if needed.    Orders:  -     ciprofloxacin (CIPRO) 500 MG tablet; Take 1 tablet by mouth 2 (Two) Times a Day.  Dispense: 20 tablet; Refill: 0  -     predniSONE (DELTASONE) 20 MG tablet; Take 2 tablets by mouth Daily for 5 days.  Dispense: 10 tablet; Refill: 0  -     guaiFENesin-codeine (GUAIFENESIN AC) 100-10 MG/5ML liquid; Take 5 mL by mouth 3 (Three) Times a Day As Needed for Cough.  Dispense: 180 mL; Refill: 0    4. Essential hypertension  Assessment & Plan:  Hypertension is worsening.  Continue current treatment regimen.  Dietary sodium restriction.  Weight loss.  Regular aerobic exercise.  Continue current medications.  Blood pressure will be reassessed in 3 months.             Follow Up   No follow-ups on file.  Patient was given instructions and counseling regarding his condition or for health maintenance advice. Please see specific information pulled into the AVS if appropriate.       Answers for HPI/ROS submitted by the patient on 9/6/2022  What is the primary reason for your visit?: High Blood Pressure

## 2022-09-12 NOTE — ASSESSMENT & PLAN NOTE
Patient was started on Cialis 5 mg daily.  Patient is encouraged return to clinic if her symptoms do not improve.

## 2022-09-12 NOTE — ASSESSMENT & PLAN NOTE
he was prescribed Cipro, prednisone and Cheratussin to treat his symptoms.    Increase fluids. Tylenol/motrin for pain or fever.   Medication and medication adverse effects discussed.    Follow-up 5-7 days for reevaluation if not improved or sooner if needed.

## 2022-12-12 ENCOUNTER — OFFICE VISIT (OUTPATIENT)
Dept: FAMILY MEDICINE CLINIC | Facility: CLINIC | Age: 37
End: 2022-12-12

## 2022-12-12 VITALS
WEIGHT: 268 LBS | OXYGEN SATURATION: 97 % | SYSTOLIC BLOOD PRESSURE: 120 MMHG | BODY MASS INDEX: 38.37 KG/M2 | HEART RATE: 97 BPM | HEIGHT: 70 IN | RESPIRATION RATE: 18 BRPM | TEMPERATURE: 97.5 F | DIASTOLIC BLOOD PRESSURE: 86 MMHG

## 2022-12-12 DIAGNOSIS — I10 ESSENTIAL HYPERTENSION: ICD-10-CM

## 2022-12-12 DIAGNOSIS — N52.9 ERECTILE DYSFUNCTION, UNSPECIFIED ERECTILE DYSFUNCTION TYPE: Primary | ICD-10-CM

## 2022-12-12 DIAGNOSIS — E55.9 VITAMIN D DEFICIENCY: ICD-10-CM

## 2022-12-12 DIAGNOSIS — I10 ESSENTIAL HYPERTENSION: Primary | ICD-10-CM

## 2022-12-12 PROCEDURE — 99213 OFFICE O/P EST LOW 20 MIN: CPT | Performed by: FAMILY MEDICINE

## 2022-12-12 PROCEDURE — 99395 PREV VISIT EST AGE 18-39: CPT | Performed by: FAMILY MEDICINE

## 2022-12-12 RX ORDER — PREDNISONE 20 MG/1
TABLET ORAL
COMMUNITY
Start: 2022-12-05

## 2022-12-12 RX ORDER — AMOXICILLIN AND CLAVULANATE POTASSIUM 875; 125 MG/1; MG/1
1 TABLET, FILM COATED ORAL 2 TIMES DAILY
COMMUNITY
Start: 2022-12-05

## 2022-12-12 RX ORDER — AMOXICILLIN AND CLAVULANATE POTASSIUM 875; 125 MG/1; MG/1
1 TABLET, FILM COATED ORAL
COMMUNITY
Start: 2022-12-05 | End: 2022-12-15

## 2022-12-12 RX ORDER — ALBUTEROL SULFATE 90 UG/1
2 AEROSOL, METERED RESPIRATORY (INHALATION)
COMMUNITY
Start: 2022-12-05 | End: 2022-12-19

## 2022-12-12 NOTE — PROGRESS NOTES
"Chief Complaint  Chief Complaint   Patient presents with   • Hypertension     Pt here for f/u of BP       Subjective    History of Present Illness        Paulino Miranda presents to [facility] for follow-up on blood pressure.  History of Present Illness    Health maintenance  The patient reports that removing the diuretic from his medication helped improve his previous difficulty with \" dribbling on himself \". His blood pressure is well controlled today. The patient states that with any sinus infection, he experiences bronchitis. He reports a history of bronchitis approximately 4 or 5 times this year. He denies needing any refills for his current medication at this time. The patient states he lost about 20 pounds of bodyweight since his last appointment while consuming a keto diet. He reports that his last physical was approximately 3 or 4 years ago for his job.         Objective   Vital Signs:   Visit Vitals  /86   Pulse 97   Temp 97.5 °F (36.4 °C)   Resp 18   Ht 177.8 cm (70\")   Wt 122 kg (268 lb)   SpO2 97%   BMI 38.45 kg/m²          Physical Exam  Vitals reviewed.   Constitutional:       Appearance: He is well-developed.   HENT:      Head: Normocephalic.      Right Ear: External ear normal.      Left Ear: External ear normal.      Nose: Nose normal.   Eyes:      Conjunctiva/sclera: Conjunctivae normal.   Cardiovascular:      Rate and Rhythm: Normal rate and regular rhythm.   Pulmonary:      Effort: Pulmonary effort is normal.      Breath sounds: Normal breath sounds.   Musculoskeletal:         General: Normal range of motion.      Cervical back: Normal range of motion and neck supple.   Skin:     General: Skin is warm and dry.      Capillary Refill: Capillary refill takes less than 2 seconds.   Neurological:      Mental Status: He is alert and oriented to person, place, and time.              Result Review :                      Assessment and Plan      Diagnoses and all " orders for this visit:    1. Essential hypertension (Primary)  Assessment & Plan:  Hypertension is improving with treatment.  Continue current treatment regimen.  Dietary sodium restriction.  Weight loss.  Regular aerobic exercise.  Continue current medications.  Blood pressure will be reassessed at the next regular appointment.         Health maintenance  - Will return 2023 for physical and laboratory studies as discussed.  - Continue his current medication regimen.        Follow Up   No follow-ups on file.  Patient was given instructions and counseling regarding his condition or for health maintenance advice. Please see specific information pulled into the AVS if appropriate.       Transcribed from ambient dictation for Haja Massey Sr, MD by Nida Gutierrez.  12/12/22   18:38 EST    Patient or patient representative verbalized consent to the visit recording.  I have personally performed the services described in this document as transcribed by the above individual, and it is both accurate and complete.

## 2023-01-11 RX ORDER — LISINOPRIL 20 MG/1
TABLET ORAL
Qty: 90 TABLET | Refills: 0 | Status: SHIPPED | OUTPATIENT
Start: 2023-01-11 | End: 2023-04-03

## 2023-04-03 RX ORDER — LISINOPRIL 20 MG/1
TABLET ORAL
Qty: 90 TABLET | Refills: 0 | Status: SHIPPED | OUTPATIENT
Start: 2023-04-03

## 2023-09-28 NOTE — TELEPHONE ENCOUNTER
Rx Refill Note  Requested Prescriptions     Pending Prescriptions Disp Refills    lisinopril (PRINIVIL,ZESTRIL) 20 MG tablet [Pharmacy Med Name: Lisinopril 20 MG Oral Tablet] 90 tablet 0     Sig: Take 1 tablet by mouth once daily      Last office visit with prescribing clinician: 7/20/2022   Last telemedicine visit with prescribing clinician: Visit date not found   Next office visit with prescribing clinician: Visit date not found                         Would you like a call back once the refill request has been completed: [] Yes [] No    If the office needs to give you a call back, can they leave a voicemail: [] Yes [] No    Charissa Martinez MA  09/28/23, 09:36 EDT

## 2023-09-30 RX ORDER — LISINOPRIL 20 MG/1
TABLET ORAL
Qty: 90 TABLET | Refills: 0 | Status: SHIPPED | OUTPATIENT
Start: 2023-09-30

## 2023-10-14 DIAGNOSIS — F41.9 ANXIETY: ICD-10-CM

## 2023-10-14 DIAGNOSIS — N52.2 DRUG-INDUCED ERECTILE DYSFUNCTION: ICD-10-CM

## 2023-10-16 RX ORDER — CITALOPRAM 20 MG/1
20 TABLET ORAL DAILY
Qty: 90 TABLET | Refills: 2 | Status: SHIPPED | OUTPATIENT
Start: 2023-10-16

## 2023-10-16 RX ORDER — TADALAFIL 5 MG/1
5 TABLET ORAL DAILY PRN
Qty: 90 TABLET | Refills: 2 | Status: SHIPPED | OUTPATIENT
Start: 2023-10-16

## 2023-10-16 NOTE — TELEPHONE ENCOUNTER
Rx Refill Note  Requested Prescriptions     Pending Prescriptions Disp Refills    citalopram (CeleXA) 20 MG tablet [Pharmacy Med Name: Citalopram Hydrobromide 20 MG Oral Tablet] 90 tablet 0     Sig: Take 1 tablet by mouth once daily    tadalafil (CIALIS) 5 MG tablet [Pharmacy Med Name: Tadalafil 5 MG Oral Tablet] 90 tablet 0     Sig: TAKE 1 TABLET BY MOUTH ONCE DAILY AS NEEDED FOR ERECTILE DYSFUNCTION      Last office visit with prescribing clinician: 12/12/2022   Last telemedicine visit with prescribing clinician: Visit date not found   Next office visit with prescribing clinician: Visit date not found                         Would you like a call back once the refill request has been completed: [] Yes [] No    If the office needs to give you a call back, can they leave a voicemail: [] Yes [] No    Paulino Talley MA  10/16/23, 09:32 EDT

## 2023-12-11 ENCOUNTER — TELEMEDICINE (OUTPATIENT)
Dept: FAMILY MEDICINE CLINIC | Facility: CLINIC | Age: 38
End: 2023-12-11
Payer: COMMERCIAL

## 2023-12-11 DIAGNOSIS — F41.9 ANXIETY: Primary | ICD-10-CM

## 2023-12-11 PROCEDURE — 99213 OFFICE O/P EST LOW 20 MIN: CPT | Performed by: FAMILY MEDICINE

## 2023-12-11 RX ORDER — BUPROPION HYDROCHLORIDE 150 MG/1
150 TABLET ORAL DAILY
Qty: 30 TABLET | Refills: 1 | Status: SHIPPED | OUTPATIENT
Start: 2023-12-11

## 2023-12-11 NOTE — PROGRESS NOTES
Chief Complaint  Chief Complaint   Patient presents with    Anxiety     Pt here to discuss trying different medication          Video/Telephone Visit    Subjective    History of Present Illness        Paulino Miranda presents to [facility] for   History of Present Illness  Patient is a 38-year-old male is being seen via telehealth for anxiety.  Patient states that the Celexa is working but is causing him to have sexual side effects such as delayed ejaculation.  Patient will like to change his medication to something that it is we will not cause the delayed ejaculation.         Objective   Vital Signs:   There were no vitals taken for this visit.    Physical Exam   No Video working      Result Review :                      Assessment and Plan      Diagnoses and all orders for this visit:    1. Anxiety (Primary)  Assessment & Plan:  Patient was advised to wean off of Celexa.  Patient was sent a new prescription for Wellbutrin.  Patient was advised medication may take 2 weeks to start working.  Patient was encouraged to return to clinic if symptoms do not improve.    Orders:  -     buPROPion XL (WELLBUTRIN XL) 150 MG 24 hr tablet; Take 1 tablet by mouth Daily.  Dispense: 30 tablet; Refill: 1         I spent 8 minutes caring for Paulino on this date of service. This time includes time spent by me in the following activities:preparing for the visit, counseling and educating the patient/family/caregiver, ordering medications, tests, or procedures, documenting information in the medical record, and care coordination    The use of a video visit has been reviewed with the patient and verbal informed consent has been obtained.    Follow Up   No follow-ups on file.  Patient was given instructions and counseling regarding his condition or for health maintenance advice. Please see specific information pulled into the AVS if appropriate.

## 2023-12-11 NOTE — ASSESSMENT & PLAN NOTE
Patient was advised to wean off of Celexa.  Patient was sent a new prescription for Wellbutrin.  Patient was advised medication may take 2 weeks to start working.  Patient was encouraged to return to clinic if symptoms do not improve.

## 2024-01-19 ENCOUNTER — TELEMEDICINE (OUTPATIENT)
Dept: FAMILY MEDICINE CLINIC | Facility: CLINIC | Age: 39
End: 2024-01-19
Payer: COMMERCIAL

## 2024-01-19 DIAGNOSIS — F41.9 ANXIETY: Primary | ICD-10-CM

## 2024-01-19 PROCEDURE — 99213 OFFICE O/P EST LOW 20 MIN: CPT | Performed by: FAMILY MEDICINE

## 2024-01-19 NOTE — PROGRESS NOTES
Chief Complaint  Chief Complaint   Patient presents with    Hypertension     1 mon f/u        Video/Telephone Visit    Subjective    History of Present Illness        Paulino Miranda presents to [facility] for   Anxiety  Presents for follow-up visit. Symptoms include nervous/anxious behavior. Patient reports no compulsions, confusion, decreased concentration, depressed mood, excessive worry, insomnia, irritability, muscle tension, panic, restlessness or suicidal ideas. The severity of symptoms is moderate. The quality of sleep is good.     Compliance with medications is %.          Objective   Vital Signs:   There were no vitals taken for this visit.    Physical Exam  Constitutional:       Appearance: Normal appearance. He is normal weight.   Neurological:      General: No focal deficit present.      Mental Status: He is alert and oriented to person, place, and time. Mental status is at baseline.   Psychiatric:         Mood and Affect: Mood normal.         Behavior: Behavior normal.         Thought Content: Thought content normal.         Judgment: Judgment normal.              Result Review :                      Assessment and Plan      Diagnoses and all orders for this visit:    1. Anxiety (Primary)  Assessment & Plan:  Patient was switched to Wellbutrin recently.  Patient states he is doing well with the medication.  Patient was encouraged to come back to the clinic in 4 to 6 months for follow-up.           I spent 10 minutes caring for Paulino on this date of service. This time includes time spent by me in the following activities:preparing for the visit, counseling and educating the patient/family/caregiver, documenting information in the medical record, and care coordination    The use of a video visit has been reviewed with the patient and verbal informed consent has been obtained.    Follow Up   No follow-ups on file.  Patient was given instructions and counseling  regarding his condition or for health maintenance advice. Please see specific information pulled into the AVS if appropriate.

## 2024-01-19 NOTE — ASSESSMENT & PLAN NOTE
Patient was switched to Wellbutrin recently.  Patient states he is doing well with the medication.  Patient was encouraged to come back to the clinic in 4 to 6 months for follow-up.

## 2024-01-22 RX ORDER — LISINOPRIL 20 MG/1
TABLET ORAL
Qty: 90 TABLET | Refills: 2 | Status: SHIPPED | OUTPATIENT
Start: 2024-01-22

## 2024-02-15 DIAGNOSIS — F41.9 ANXIETY: ICD-10-CM

## 2024-02-15 RX ORDER — BUPROPION HYDROCHLORIDE 150 MG/1
150 TABLET ORAL DAILY
Qty: 90 TABLET | Refills: 3 | Status: SHIPPED | OUTPATIENT
Start: 2024-02-15

## 2024-11-04 RX ORDER — LISINOPRIL 20 MG/1
TABLET ORAL
Qty: 30 TABLET | Refills: 0 | Status: SHIPPED | OUTPATIENT
Start: 2024-11-04

## 2024-11-04 NOTE — TELEPHONE ENCOUNTER
Rx Refill Note  Requested Prescriptions     Pending Prescriptions Disp Refills   • lisinopril (PRINIVIL,ZESTRIL) 20 MG tablet [Pharmacy Med Name: Lisinopril 20 MG Oral Tablet] 90 tablet 0     Sig: Take 1 tablet by mouth once daily      Last office visit with prescribing clinician: 12/12/2022      Next office visit with prescribing clinician: Visit date not found   3}  Shaila Grimes  11/04/24, 07:53 EST

## 2024-12-09 ENCOUNTER — OFFICE VISIT (OUTPATIENT)
Dept: FAMILY MEDICINE CLINIC | Facility: CLINIC | Age: 39
End: 2024-12-09
Payer: COMMERCIAL

## 2024-12-09 VITALS
BODY MASS INDEX: 38.37 KG/M2 | DIASTOLIC BLOOD PRESSURE: 84 MMHG | OXYGEN SATURATION: 97 % | RESPIRATION RATE: 18 BRPM | HEART RATE: 118 BPM | SYSTOLIC BLOOD PRESSURE: 124 MMHG | WEIGHT: 268 LBS | HEIGHT: 70 IN

## 2024-12-09 DIAGNOSIS — K21.9 GASTROESOPHAGEAL REFLUX DISEASE WITHOUT ESOPHAGITIS: ICD-10-CM

## 2024-12-09 DIAGNOSIS — I10 ESSENTIAL HYPERTENSION: Primary | ICD-10-CM

## 2024-12-09 DIAGNOSIS — N52.2 DRUG-INDUCED ERECTILE DYSFUNCTION: ICD-10-CM

## 2024-12-09 PROCEDURE — 99214 OFFICE O/P EST MOD 30 MIN: CPT | Performed by: FAMILY MEDICINE

## 2024-12-09 RX ORDER — FAMOTIDINE 40 MG/1
40 TABLET, FILM COATED ORAL DAILY
Qty: 90 TABLET | Refills: 2 | Status: SHIPPED | OUTPATIENT
Start: 2024-12-09

## 2024-12-09 RX ORDER — LISINOPRIL 20 MG/1
20 TABLET ORAL DAILY
Qty: 90 TABLET | Refills: 3 | Status: SHIPPED | OUTPATIENT
Start: 2024-12-09

## 2024-12-09 RX ORDER — TADALAFIL 5 MG/1
5 TABLET ORAL DAILY PRN
Qty: 90 TABLET | Refills: 3 | Status: SHIPPED | OUTPATIENT
Start: 2024-12-09

## 2024-12-09 RX ORDER — PANTOPRAZOLE SODIUM 40 MG/1
40 TABLET, DELAYED RELEASE ORAL DAILY
Qty: 90 TABLET | Refills: 2 | Status: SHIPPED | OUTPATIENT
Start: 2024-12-09

## 2024-12-09 NOTE — PROGRESS NOTES
"Chief Complaint  Chief Complaint   Patient presents with    Med Refill     Pt here for med refills       Subjective    History of Present Illness        Paulino Miranda presents to [facility] for   Hypertension  This is a chronic problem. The current episode started more than 1 year ago. The problem has been stable since onset. The problem is controlled. Pertinent negatives include no anxiety, headaches, malaise/fatigue, neck pain, PND or shortness of breath. Risk factors for coronary artery disease include male gender, obesity and family history. Current antihypertension treatment includes ACE inhibitors. The current treatment provides significant improvement. There are no compliance problems.  There is no history of coarctation of the aorta, hyperaldosteronism, hypercortisolism, hyperparathyroidism, pheochromocytoma or renovascular disease.   Heartburn  This is a chronic problem. The current episode started more than 1 year ago. The problem occurs constantly. The problem has been unchanged. The symptoms are aggravated by certain foods. Pertinent negatives include no melena or orthopnea. He has tried a PPI and an antacid for the symptoms. The treatment provided significant relief.   Erectile Dysfunction  This is a chronic problem. The current episode started more than 1 year ago. The problem is unchanged. The nature of his difficulty is maintaining erection. He reports no anxiety, decreased libido or performance anxiety. Irritative symptoms do not include frequency, nocturia or urgency. Obstructive symptoms do not include dribbling or an intermittent stream. Pertinent negatives include no dysuria, genital pain, hematuria or inability to urinate. Past treatments include tadalafil. The treatment provided significant relief.      History of Present Illness      Objective   Vital Signs:   Visit Vitals  /84   Pulse 118   Resp 18   Ht 177.8 cm (70\")   Wt 122 kg (268 lb)   SpO2 97% "   BMI 38.45 kg/m²          Class 2 Severe Obesity (BMI >=35 and <=39.9). Obesity-related health conditions include the following: hypertension. Obesity is unchanged. BMI is is above average; BMI management plan is completed. We discussed portion control and increasing exercise.     Physical Exam  Vitals reviewed.   Constitutional:       Appearance: He is well-developed.   HENT:      Head: Normocephalic.      Right Ear: External ear normal.      Left Ear: External ear normal.      Nose: Nose normal.   Eyes:      Conjunctiva/sclera: Conjunctivae normal.   Cardiovascular:      Rate and Rhythm: Normal rate and regular rhythm.   Pulmonary:      Effort: Pulmonary effort is normal.      Breath sounds: Normal breath sounds.   Musculoskeletal:         General: Normal range of motion.      Cervical back: Normal range of motion and neck supple.   Skin:     General: Skin is warm and dry.      Capillary Refill: Capillary refill takes less than 2 seconds.   Neurological:      Mental Status: He is alert and oriented to person, place, and time.        Physical Exam           Result Review :  Results                            Assessment and Plan      Diagnoses and all orders for this visit:    1. Essential hypertension (Primary)  Assessment & Plan:  Hypertension is stable and controlled  Continue current treatment regimen.  Dietary sodium restriction.  Weight loss.  Regular aerobic exercise.  Blood pressure will be reassessed in 6 months.    Orders:  -     lisinopril (PRINIVIL,ZESTRIL) 20 MG tablet; Take 1 tablet by mouth Daily.  Dispense: 90 tablet; Refill: 3    2. Gastroesophageal reflux disease without esophagitis  Assessment & Plan:  Stable.  Patient is given prescription for Protonix and Pepcid to help treat his symptoms.  Patient is currently return to clinic in 6 months for follow-up.    Orders:  -     pantoprazole (PROTONIX) 40 MG EC tablet; Take 1 tablet by mouth Daily.  Dispense: 90 tablet; Refill: 2  -     famotidine  (Pepcid) 40 MG tablet; Take 1 tablet by mouth Daily.  Dispense: 90 tablet; Refill: 2    3. Drug-induced erectile dysfunction  Assessment & Plan:  Stable.  Patient was given a prescription of Cialis to help treat his symptoms.  Patient is encouraged return to clinic in 6 months for follow-up.    Orders:  -     tadalafil (CIALIS) 5 MG tablet; Take 1 tablet by mouth Daily As Needed for Erectile Dysfunction.  Dispense: 90 tablet; Refill: 3       Assessment & Plan             Follow Up   No follow-ups on file.  Patient was given instructions and counseling regarding his condition or for health maintenance advice. Please see specific information pulled into the AVS if appropriate.

## 2024-12-23 PROBLEM — K21.9 GASTROESOPHAGEAL REFLUX DISEASE WITHOUT ESOPHAGITIS: Status: ACTIVE | Noted: 2024-12-23

## 2024-12-23 PROBLEM — N52.2 DRUG-INDUCED ERECTILE DYSFUNCTION: Status: ACTIVE | Noted: 2024-12-23

## 2024-12-23 NOTE — ASSESSMENT & PLAN NOTE
Stable.  Patient is given prescription for Protonix and Pepcid to help treat his symptoms.  Patient is currently return to clinic in 6 months for follow-up.

## 2024-12-23 NOTE — ASSESSMENT & PLAN NOTE
Stable.  Patient was given a prescription of Cialis to help treat his symptoms.  Patient is encouraged return to clinic in 6 months for follow-up.

## 2025-01-30 ENCOUNTER — OFFICE (OUTPATIENT)
Dept: URBAN - METROPOLITAN AREA CLINIC 64 | Facility: CLINIC | Age: 40
End: 2025-01-30
Payer: COMMERCIAL

## 2025-01-30 VITALS
HEART RATE: 106 BPM | DIASTOLIC BLOOD PRESSURE: 102 MMHG | HEIGHT: 69 IN | SYSTOLIC BLOOD PRESSURE: 133 MMHG | WEIGHT: 266 LBS

## 2025-01-30 DIAGNOSIS — R68.81 EARLY SATIETY: ICD-10-CM

## 2025-01-30 DIAGNOSIS — R11.0 NAUSEA: ICD-10-CM

## 2025-01-30 DIAGNOSIS — K21.9 GASTRO-ESOPHAGEAL REFLUX DISEASE WITHOUT ESOPHAGITIS: ICD-10-CM

## 2025-01-30 PROCEDURE — 99204 OFFICE O/P NEW MOD 45 MIN: CPT | Performed by: INTERNAL MEDICINE

## 2025-02-01 DIAGNOSIS — F41.9 ANXIETY: ICD-10-CM

## 2025-02-03 RX ORDER — BUPROPION HYDROCHLORIDE 150 MG/1
150 TABLET ORAL DAILY
Qty: 90 TABLET | Refills: 0 | Status: SHIPPED | OUTPATIENT
Start: 2025-02-03

## 2025-05-14 DIAGNOSIS — F41.9 ANXIETY: ICD-10-CM

## 2025-05-15 RX ORDER — BUPROPION HYDROCHLORIDE 150 MG/1
150 TABLET ORAL DAILY
Qty: 90 TABLET | Refills: 0 | Status: SHIPPED | OUTPATIENT
Start: 2025-05-15

## 2025-05-15 NOTE — TELEPHONE ENCOUNTER
HUB RELAY: Lvm for pt that med was filled. Pt to call back to schedule appt for a med follow up/ 6 month follow up at end of June or July. Please schedule pt.

## 2025-08-21 DIAGNOSIS — F41.9 ANXIETY: ICD-10-CM

## 2025-08-30 RX ORDER — BUPROPION HYDROCHLORIDE 150 MG/1
150 TABLET ORAL DAILY
Qty: 90 TABLET | Refills: 1 | Status: SHIPPED | OUTPATIENT
Start: 2025-08-30